# Patient Record
Sex: FEMALE | Race: OTHER | NOT HISPANIC OR LATINO
[De-identification: names, ages, dates, MRNs, and addresses within clinical notes are randomized per-mention and may not be internally consistent; named-entity substitution may affect disease eponyms.]

---

## 2017-10-06 ENCOUNTER — TRANSCRIPTION ENCOUNTER (OUTPATIENT)
Age: 52
End: 2017-10-06

## 2017-10-25 ENCOUNTER — APPOINTMENT (OUTPATIENT)
Dept: OBGYN | Facility: CLINIC | Age: 52
End: 2017-10-25
Payer: COMMERCIAL

## 2017-10-25 VITALS
DIASTOLIC BLOOD PRESSURE: 70 MMHG | SYSTOLIC BLOOD PRESSURE: 114 MMHG | BODY MASS INDEX: 19.93 KG/M2 | HEIGHT: 66 IN | WEIGHT: 124 LBS

## 2017-10-25 DIAGNOSIS — Z00.00 ENCOUNTER FOR GENERAL ADULT MEDICAL EXAMINATION W/OUT ABNORMAL FINDINGS: ICD-10-CM

## 2017-10-25 DIAGNOSIS — N95.2 POSTMENOPAUSAL ATROPHIC VAGINITIS: ICD-10-CM

## 2017-10-25 PROCEDURE — 99396 PREV VISIT EST AGE 40-64: CPT

## 2017-10-25 PROCEDURE — 82270 OCCULT BLOOD FECES: CPT

## 2017-10-25 PROCEDURE — 99213 OFFICE O/P EST LOW 20 MIN: CPT | Mod: 25

## 2017-10-25 RX ORDER — ESTRADIOL 0.1 MG/G
0.1 CREAM VAGINAL
Qty: 70 | Refills: 1 | Status: ACTIVE | COMMUNITY
Start: 2017-10-25 | End: 1900-01-01

## 2017-10-26 LAB — HPV HIGH+LOW RISK DNA PNL CVX: NOT DETECTED

## 2017-10-31 LAB — CYTOLOGY CVX/VAG DOC THIN PREP: NORMAL

## 2018-05-08 ENCOUNTER — EMERGENCY (EMERGENCY)
Facility: HOSPITAL | Age: 53
LOS: 1 days | Discharge: HOME | End: 2018-05-08
Attending: EMERGENCY MEDICINE
Payer: COMMERCIAL

## 2018-05-08 VITALS
HEART RATE: 63 BPM | TEMPERATURE: 98 F | OXYGEN SATURATION: 100 % | DIASTOLIC BLOOD PRESSURE: 77 MMHG | RESPIRATION RATE: 18 BRPM | SYSTOLIC BLOOD PRESSURE: 130 MMHG

## 2018-05-08 VITALS — WEIGHT: 220.02 LBS | HEIGHT: 72 IN

## 2018-05-08 PROCEDURE — 99284 EMERGENCY DEPT VISIT MOD MDM: CPT | Mod: 25

## 2018-05-08 PROCEDURE — 70450 CT HEAD/BRAIN W/O DYE: CPT | Mod: 26

## 2018-05-08 PROCEDURE — 70450 CT HEAD/BRAIN W/O DYE: CPT

## 2018-05-08 RX ORDER — IBUPROFEN 200 MG
600 TABLET ORAL ONCE
Qty: 0 | Refills: 0 | Status: DISCONTINUED | OUTPATIENT
Start: 2018-05-08 | End: 2018-05-13

## 2018-05-08 NOTE — ED PROVIDER NOTE - CARE PLAN
Principal Discharge DX:	Acute nonintractable headache, unspecified headache type  Assessment and plan of treatment:	aleve 2 tablets every 12 hours as needed or ibuprofen 600mg (3 tablets of motrin or advil) every 6 hours as needed.  May take extra strength tylenol 2 tbalets every 4 hours for breakthrough headache.  Follow-up with Dr sheriff later this week for re-assessment.  Follow-uyp with neurology for further evalaution if you develop recurrent headaches.  Return immediately to the ER for re-evaluation if your symptoms recur or worsening.

## 2018-05-08 NOTE — ED PROVIDER NOTE - PROGRESS NOTE DETAILS
results of CT reviewed and shared with patient.  Headache of gradual onset receiving peak after several hours; not suggestive of SAH.  Anticipatory guidance provided.

## 2018-05-08 NOTE — ED PROVIDER NOTE - NEUROLOGICAL, MLM
CN II-XII grossly in tact. Normal finger to nose. No pronator drift. Normal gait without assistance. Reflexes 2+ bilaterally

## 2018-05-08 NOTE — ED ADULT TRIAGE NOTE - CHIEF COMPLAINT QUOTE
patient ambulated to er - states " I think I am having a heart attack - I have pain in my back and it is hard to breath - I have an irregular ekg - I have inverted t waves " ambulated to room 3 for ekg patient ambulated to er - states " I have a really bad migraine"

## 2018-05-08 NOTE — ED PROVIDER NOTE - OBJECTIVE STATEMENT
52 y/o F pt with no pertinent pmhx of presents to the ED c/o headache that onset today. Headache was waxing and waning throughout the day, exacerbating at 2230 05/07/18. Describes an excruciating pain. Reports associated nausea, vomiting, photophobia and light sensitivity. When she closed her eyes she "sees a white strobing in her eyes." She has never had this symptoms in the past. Pain today is exacerbated when she moves. Took muscle relaxants, and Ibuprofen. Stats that she was unable to get herself going from the day. Denies fever, vomiting, neck stiffness, NKDA. Non smoker.   No PMD. 54 y/o F pt with no pertinent pmhx of presents to the ED c/o headache that onset today. Headache was waxing and waning throughout the day, exacerbating at 2230 05/07/18. Describes an excruciating pain in the front of the head with assoc nausea, vomiting, photophobia and light sensitivity. When she closed her eyes she "sees a white strobing in her eyes." She has never had this symptoms in the past. Pain today is exacerbated when she moves. Took muscle relaxants, and Ibuprofen. Denies fever, vomiting, neck stiffness, paresthesias. NKDA. Non smoker.   No PMD.

## 2018-05-08 NOTE — ED PROVIDER NOTE - CHPI ED SYMPTOMS NEG
no dizziness/no weakness/no vomiting/no fever/no change in level of consciousness/no loss of consciousness/no blurred vision

## 2018-08-17 ENCOUNTER — EMERGENCY (EMERGENCY)
Facility: HOSPITAL | Age: 53
LOS: 1 days | Discharge: HOME | End: 2018-08-17
Attending: EMERGENCY MEDICINE
Payer: COMMERCIAL

## 2018-08-17 VITALS
HEIGHT: 66 IN | DIASTOLIC BLOOD PRESSURE: 71 MMHG | SYSTOLIC BLOOD PRESSURE: 111 MMHG | TEMPERATURE: 99 F | HEART RATE: 87 BPM | RESPIRATION RATE: 18 BRPM | OXYGEN SATURATION: 97 % | WEIGHT: 119.05 LBS

## 2018-08-17 VITALS
HEART RATE: 66 BPM | RESPIRATION RATE: 18 BRPM | DIASTOLIC BLOOD PRESSURE: 82 MMHG | OXYGEN SATURATION: 100 % | TEMPERATURE: 98 F | SYSTOLIC BLOOD PRESSURE: 115 MMHG

## 2018-08-17 PROCEDURE — 99283 EMERGENCY DEPT VISIT LOW MDM: CPT | Mod: 25

## 2018-08-17 PROCEDURE — 99283 EMERGENCY DEPT VISIT LOW MDM: CPT

## 2018-08-17 NOTE — ED PROVIDER NOTE - ATTENDING CONTRIBUTION TO CARE
53y old with complaints of acute facila swelling, took benadryl, is improved, no erythema, no evidence of cellulitis, plan to give steroids one time, and followup

## 2018-08-17 NOTE — ED PROVIDER NOTE - OBJECTIVE STATEMENT
52 y/o F c/o allergic reaction which started just prior to arrival in ED.  Patient states that her skin became itchy and her right eye swelled up.  Denies nausea/vomiting or difficulty breathing or swallowing.  Patient took 2 benadryl and states that she now feels better.

## 2018-08-17 NOTE — ED ADULT NURSE NOTE - NSIMPLEMENTINTERV_GEN_ALL_ED
Implemented All Universal Safety Interventions:  [place] to call system. Call bell, personal items and telephone within reach. Instruct patient to call for assistance. Room bathroom lighting operational. Non-slip footwear when patient is off stretcher. Physically safe environment: no spills, clutter or unnecessary equipment. Stretcher in lowest position, wheels locked, appropriate side rails in place.

## 2018-08-17 NOTE — ED ADULT NURSE REASSESSMENT NOTE - NS ED NURSE REASSESS COMMENT FT1
Pt reports feeling better, discharge instructions given and explained, pt verbalized understanding of instructions, questions encouraged and answered appropriately, VSS, pt safely discharged home.

## 2018-08-17 NOTE — ED ADULT NURSE NOTE - OBJECTIVE STATEMENT
Pt reports allergic reaction of unknown origin, reports having seafood ~1700 last night then removing make-up around ~0100 this am, reports that after make-up removal pt began to feel "itchy in my pits" pt reports itchy throat, and hives on arms and stomach, pt presents in NAD, airway uncompromised, resp even and unlabored, pt reports taking 50mg Benadryl po pta, presents with swelling to right eye. Pt denies sign medical hx, denies daily medication use.

## 2019-12-21 ENCOUNTER — TRANSCRIPTION ENCOUNTER (OUTPATIENT)
Age: 54
End: 2019-12-21

## 2021-04-13 ENCOUNTER — RESULT REVIEW (OUTPATIENT)
Age: 56
End: 2021-04-13

## 2021-06-21 ENCOUNTER — APPOINTMENT (OUTPATIENT)
Dept: DISASTER EMERGENCY | Facility: CLINIC | Age: 56
End: 2021-06-21

## 2021-06-21 ENCOUNTER — OUTPATIENT (OUTPATIENT)
Dept: OUTPATIENT SERVICES | Facility: HOSPITAL | Age: 56
LOS: 1 days | End: 2021-06-21
Payer: COMMERCIAL

## 2021-06-21 DIAGNOSIS — Z01.818 ENCOUNTER FOR OTHER PREPROCEDURAL EXAMINATION: ICD-10-CM

## 2021-06-21 LAB
ALBUMIN SERPL ELPH-MCNC: 4.6 G/DL — SIGNIFICANT CHANGE UP (ref 3.3–5.2)
ALP SERPL-CCNC: 45 U/L — SIGNIFICANT CHANGE UP (ref 40–120)
ALT FLD-CCNC: 17 U/L — SIGNIFICANT CHANGE UP
ANION GAP SERPL CALC-SCNC: 13 MMOL/L — SIGNIFICANT CHANGE UP (ref 5–17)
AST SERPL-CCNC: 26 U/L — SIGNIFICANT CHANGE UP
BASOPHILS # BLD AUTO: 0 K/UL — SIGNIFICANT CHANGE UP (ref 0–0.2)
BASOPHILS NFR BLD AUTO: 0 % — SIGNIFICANT CHANGE UP (ref 0–2)
BILIRUB SERPL-MCNC: 0.4 MG/DL — SIGNIFICANT CHANGE UP (ref 0.4–2)
BUN SERPL-MCNC: 11 MG/DL — SIGNIFICANT CHANGE UP (ref 8–20)
CALCIUM SERPL-MCNC: 9.1 MG/DL — SIGNIFICANT CHANGE UP (ref 8.6–10.2)
CHLORIDE SERPL-SCNC: 101 MMOL/L — SIGNIFICANT CHANGE UP (ref 98–107)
CO2 SERPL-SCNC: 26 MMOL/L — SIGNIFICANT CHANGE UP (ref 22–29)
CREAT SERPL-MCNC: 0.57 MG/DL — SIGNIFICANT CHANGE UP (ref 0.5–1.3)
EOSINOPHIL # BLD AUTO: 0.06 K/UL — SIGNIFICANT CHANGE UP (ref 0–0.5)
EOSINOPHIL NFR BLD AUTO: 1.7 % — SIGNIFICANT CHANGE UP (ref 0–6)
GIANT PLATELETS BLD QL SMEAR: PRESENT — SIGNIFICANT CHANGE UP
GLUCOSE SERPL-MCNC: 86 MG/DL — SIGNIFICANT CHANGE UP (ref 70–99)
HCT VFR BLD CALC: 38.1 % — SIGNIFICANT CHANGE UP (ref 34.5–45)
HGB BLD-MCNC: 13.1 G/DL — SIGNIFICANT CHANGE UP (ref 11.5–15.5)
LYMPHOCYTES # BLD AUTO: 1.41 K/UL — SIGNIFICANT CHANGE UP (ref 1–3.3)
LYMPHOCYTES # BLD AUTO: 41.2 % — SIGNIFICANT CHANGE UP (ref 13–44)
MANUAL SMEAR VERIFICATION: SIGNIFICANT CHANGE UP
MCHC RBC-ENTMCNC: 31.9 PG — SIGNIFICANT CHANGE UP (ref 27–34)
MCHC RBC-ENTMCNC: 34.4 GM/DL — SIGNIFICANT CHANGE UP (ref 32–36)
MCV RBC AUTO: 92.7 FL — SIGNIFICANT CHANGE UP (ref 80–100)
MONOCYTES # BLD AUTO: 0.18 K/UL — SIGNIFICANT CHANGE UP (ref 0–0.9)
MONOCYTES NFR BLD AUTO: 5.3 % — SIGNIFICANT CHANGE UP (ref 2–14)
NEUTROPHILS # BLD AUTO: 1.74 K/UL — LOW (ref 1.8–7.4)
NEUTROPHILS NFR BLD AUTO: 50.9 % — SIGNIFICANT CHANGE UP (ref 43–77)
PLAT MORPH BLD: NORMAL — SIGNIFICANT CHANGE UP
PLATELET # BLD AUTO: 205 K/UL — SIGNIFICANT CHANGE UP (ref 150–400)
POTASSIUM SERPL-MCNC: 4.3 MMOL/L — SIGNIFICANT CHANGE UP (ref 3.5–5.3)
POTASSIUM SERPL-SCNC: 4.3 MMOL/L — SIGNIFICANT CHANGE UP (ref 3.5–5.3)
PROT SERPL-MCNC: 7.4 G/DL — SIGNIFICANT CHANGE UP (ref 6.6–8.7)
RBC # BLD: 4.11 M/UL — SIGNIFICANT CHANGE UP (ref 3.8–5.2)
RBC # FLD: 12.7 % — SIGNIFICANT CHANGE UP (ref 10.3–14.5)
RBC BLD AUTO: NORMAL — SIGNIFICANT CHANGE UP
SARS-COV-2 RNA SPEC QL NAA+PROBE: SIGNIFICANT CHANGE UP
SODIUM SERPL-SCNC: 140 MMOL/L — SIGNIFICANT CHANGE UP (ref 135–145)
VARIANT LYMPHS # BLD: 0.9 % — SIGNIFICANT CHANGE UP (ref 0–6)
WBC # BLD: 3.42 K/UL — LOW (ref 3.8–10.5)
WBC # FLD AUTO: 3.42 K/UL — LOW (ref 3.8–10.5)

## 2021-06-21 PROCEDURE — 93010 ELECTROCARDIOGRAM REPORT: CPT

## 2021-06-21 PROCEDURE — G0463: CPT

## 2021-06-21 PROCEDURE — 93005 ELECTROCARDIOGRAM TRACING: CPT

## 2021-06-24 ENCOUNTER — RESULT REVIEW (OUTPATIENT)
Age: 56
End: 2021-06-24

## 2022-02-13 ENCOUNTER — TRANSCRIPTION ENCOUNTER (OUTPATIENT)
Age: 57
End: 2022-02-13

## 2022-08-23 NOTE — ED ADULT TRIAGE NOTE - WEIGHT IN KG
52 Cartilage Graft Text: The defect edges were debeveled with a #15 scalpel blade.  Given the location of the defect, shape of the defect, the fact the defect involved a full thickness cartilage defect a cartilage graft was deemed most appropriate.  An appropriate donor site was identified, cleansed, and anesthetized. The cartilage graft was then harvested and transferred to the recipient site, oriented appropriately and then sutured into place.  The secondary defect was then repaired using a primary closure.

## 2022-10-28 ENCOUNTER — RESULT REVIEW (OUTPATIENT)
Age: 57
End: 2022-10-28

## 2023-01-22 NOTE — ED PROVIDER NOTE - PLAN OF CARE
No
aleve 2 tablets every 12 hours as needed or ibuprofen 600mg (3 tablets of motrin or advil) every 6 hours as needed.  May take extra strength tylenol 2 tbalets every 4 hours for breakthrough headache.  Follow-up with Dr sheriff later this week for re-assessment.  Follow-uyp with neurology for further evalaution if you develop recurrent headaches.  Return immediately to the ER for re-evaluation if your symptoms recur or worsening.

## 2024-03-28 ENCOUNTER — NON-APPOINTMENT (OUTPATIENT)
Age: 59
End: 2024-03-28